# Patient Record
Sex: FEMALE | Race: WHITE | URBAN - METROPOLITAN AREA
[De-identification: names, ages, dates, MRNs, and addresses within clinical notes are randomized per-mention and may not be internally consistent; named-entity substitution may affect disease eponyms.]

---

## 2020-07-22 ENCOUNTER — TRANSFERRED RECORDS (OUTPATIENT)
Dept: HEALTH INFORMATION MANAGEMENT | Facility: CLINIC | Age: 30
End: 2020-07-22

## 2020-08-13 ENCOUNTER — HOSPITAL ENCOUNTER (OUTPATIENT)
Dept: BEHAVIORAL HEALTH | Facility: CLINIC | Age: 30
End: 2020-08-13
Attending: FAMILY MEDICINE
Payer: COMMERCIAL

## 2020-08-13 ENCOUNTER — TELEPHONE (OUTPATIENT)
Dept: BEHAVIORAL HEALTH | Facility: CLINIC | Age: 30
End: 2020-08-13

## 2020-08-13 VITALS — BODY MASS INDEX: 29.82 KG/M2 | WEIGHT: 190 LBS | HEIGHT: 67 IN

## 2020-08-13 DIAGNOSIS — Z20.822 ENCOUNTER FOR LABORATORY TESTING FOR COVID-19 VIRUS: Primary | ICD-10-CM

## 2020-08-13 LAB
LABORATORY COMMENT REPORT: NORMAL
SARS-COV-2 RNA SPEC QL NAA+PROBE: NEGATIVE
SARS-COV-2 RNA SPEC QL NAA+PROBE: NORMAL
SPECIMEN SOURCE: NORMAL
SPECIMEN SOURCE: NORMAL

## 2020-08-13 PROCEDURE — U0003 INFECTIOUS AGENT DETECTION BY NUCLEIC ACID (DNA OR RNA); SEVERE ACUTE RESPIRATORY SYNDROME CORONAVIRUS 2 (SARS-COV-2) (CORONAVIRUS DISEASE [COVID-19]), AMPLIFIED PROBE TECHNIQUE, MAKING USE OF HIGH THROUGHPUT TECHNOLOGIES AS DESCRIBED BY CMS-2020-01-R: HCPCS | Performed by: FAMILY MEDICINE

## 2020-08-13 PROCEDURE — H2035 A/D TX PROGRAM, PER HOUR: HCPCS

## 2020-08-13 PROCEDURE — 10020000 ZZH LODGING PLUS FACILITY CHARGE ADULT

## 2020-08-13 RX ORDER — ACETAMINOPHEN 325 MG/1
325-650 TABLET ORAL EVERY 4 HOURS PRN
COMMUNITY

## 2020-08-13 RX ORDER — LORATADINE 10 MG/1
10 TABLET ORAL DAILY PRN
COMMUNITY

## 2020-08-13 RX ORDER — IBUPROFEN 200 MG
200-400 TABLET ORAL EVERY 6 HOURS PRN
COMMUNITY

## 2020-08-13 RX ORDER — AMOXICILLIN 250 MG
2 CAPSULE ORAL DAILY PRN
COMMUNITY

## 2020-08-13 RX ORDER — MAGNESIUM HYDROXIDE/ALUMINUM HYDROXICE/SIMETHICONE 120; 1200; 1200 MG/30ML; MG/30ML; MG/30ML
30 SUSPENSION ORAL EVERY 6 HOURS PRN
COMMUNITY

## 2020-08-13 RX ORDER — LANOLIN ALCOHOL/MO/W.PET/CERES
3 CREAM (GRAM) TOPICAL
COMMUNITY

## 2020-08-13 ASSESSMENT — ANXIETY QUESTIONNAIRES
5. BEING SO RESTLESS THAT IT IS HARD TO SIT STILL: NEARLY EVERY DAY
1. FEELING NERVOUS, ANXIOUS, OR ON EDGE: NEARLY EVERY DAY
IF YOU CHECKED OFF ANY PROBLEMS ON THIS QUESTIONNAIRE, HOW DIFFICULT HAVE THESE PROBLEMS MADE IT FOR YOU TO DO YOUR WORK, TAKE CARE OF THINGS AT HOME, OR GET ALONG WITH OTHER PEOPLE: VERY DIFFICULT
3. WORRYING TOO MUCH ABOUT DIFFERENT THINGS: NEARLY EVERY DAY
2. NOT BEING ABLE TO STOP OR CONTROL WORRYING: MORE THAN HALF THE DAYS
7. FEELING AFRAID AS IF SOMETHING AWFUL MIGHT HAPPEN: MORE THAN HALF THE DAYS
6. BECOMING EASILY ANNOYED OR IRRITABLE: NEARLY EVERY DAY
GAD7 TOTAL SCORE: 19

## 2020-08-13 ASSESSMENT — PATIENT HEALTH QUESTIONNAIRE - PHQ9
5. POOR APPETITE OR OVEREATING: NEARLY EVERY DAY
SUM OF ALL RESPONSES TO PHQ QUESTIONS 1-9: 22

## 2020-08-13 ASSESSMENT — PAIN SCALES - GENERAL: PAINLEVEL: MILD PAIN (3)

## 2020-08-13 ASSESSMENT — MIFFLIN-ST. JEOR: SCORE: 1624.46

## 2020-08-13 NOTE — TELEPHONE ENCOUNTER
New admission today for substance treatment. No PCP established. Pt would like to resume her antidepressants. Stopped when she was using drugs daily heavily.    Dr Mitchell reviewed and okayed that it is appropriate for this patient to be scheduled.

## 2020-08-13 NOTE — PROGRESS NOTES
Initial Services Plan         Service Initiation Date: 8/13/20     Immediate health and/or safety concerns: No     Identify health and safety concern(s) below and include plan to address:     None Identified     Treatment suggestions for client during the time between intake (admit date) and completion of the individual treatment plan:      Look for a sober support network, i.e. 12 step, [facility], [facility], etc  Tour the treatment center or outpatient clinic  Introduce yourself to your treatment group. Spend time getting to know your peers  Review your patient or client handbook  Begin working on your treatment goal list     Completed by: CLARISSA Chapman  Date completed: 8/13/20

## 2020-08-13 NOTE — PROGRESS NOTES
[facility]  [address] [place] [address]                ADULT CD ASSESSMENT ADDENDUM      Patient Name: Mili Islas  Cell Phone:   Home: [phone] (home)    Mobile:   No relevant phone numbers on file.       Email:  The patient is not willing to share his/her e-mail address.  Emergency Contact: Severino Mistry   Tel: [phone]    The patient reported being:  Single, in a serious relationship    With which race do you identify? White    Initial Screening Questions     1. Are you currently having severe withdrawal symptoms that are putting yourself or others in danger?  No    2. Are you currently having severe medical problems that require immediate attention?  No    3. Are you currently having severe emotional or behavioral problems that are putting yourself or others at risk of harm?  No    4. Do you have sufficient reading skills that will enable you to understand written materials, including the program rules and client rights materials?  Yes     Family History and other additional information     Who raised you? (parents, grandparents, adoptive parents, step-parents, etc.)    Mother  Grandparents    Please tell me what it was like growing up in your family. (please include any history of substance abuse, mental health issues, emotional/physical/sexual abuse, forms of discipline, and support)      Pt states she had a really good childhood despite being raised by a teen mother. Pt's grandparents raised her too. Pt denies experiencing trauma while growing up. Pt reports getting along well with her family and also states that her stepfather does not understand addiction. Pt states her family is full of addicts and alcoholics, most of whom are in recovery. Pt has a sig other that uses methamphetamine.     Do you have any children or Stepchildren? No    Are you being investigated by Child Protection Services? No    Do you have a child protection worker, probation office or ?   Yes, explain: Probation officers in [facility] and [facility].    How would you describe your current finances?  Just making it    If you are having problems, (unpaid bills, bankruptcy, IRS problems) please explain:  No    If working or a student are you able to function appropriately in that setting? Yes, Pt states she would show up late for work or not show up at all.    Describe your preferred learning style:  by reading, by hands-on practice and by watching someone else demonstrate    What are your some of your personal strengths?  determined, focused, resilient, resourceful.    Do you currently participate in community cate activities, such as attending Anglican, temple, Pentecostalism or Jainism services?  No    How does your spirituality impact your recovery?  Client denies involvement with spiritual activities    Do you currently self-administer your medications?  Yes    Have you ever had to lie to people important to you about how much you anguiano?   No   Have you ever felt the need to bet more and more money?   No   Have you ever attempted treatment for a gambling problem?   No   Have you ever touched or fondled someone else inappropriately or forced them to have sex with you against their will?   No   Are you or have you ever been a registered sex offender?   No   Is there any history of sexual abuse in your family? No   Have you ever felt obsessed by your sexual behavior, such as having sex with many partners, masturbating often, using pornography often?   No     Have you ever received therapy or stayed in the hospital for mental health problems?   Yes, explain: therapy yes, hospital no     Have you ever hurt yourself, such as cutting, burning or hitting yourself?   No     Have you ever purged, binged or restricted yourself as a way to control your weight?   No     Are you on a special diet?   No     Do you have any concerns regarding your nutritional status?   No     Have you had any appetite changes in  the last 3 months?   No   Have you had weight loss or weight gain of more than 10 lbs in the last 3 months?   If patient gained or lost more than 10 lbs, then refer to program RN / attending Physician for assessment.   No   Was the patient informed of BMI?    Normal, No Intervention   Yes   Have you engaged in any risk-taking behavior that would put you at risk for exposure to blood-borne or sexually transmitted diseases?   No   Do you have any dental problems?   No   Have you ever lived through any trauma or stressful life events?   Yes, explain: death of my grandfather, dating a Latin Derick through [employer]   In the past month, have you had any of the following symptoms related to the trauma listed above? (dreams, intense memories, flashbacks, physical reactions, etc.)   Yes, explain: I have PTSD   Have you ever believed people were spying on you, or that someone was plotting against you or trying to hurt you?   No   Have you ever believed someone was reading your mind or could hear your thoughts or that you could actually read someone's mind or hear what another person was thinking?   No   Have you ever believed that someone of some force outside of yourself was putting thoughts into your mind or made you act in a way that was not your usual self?  Have you ever though you were possessed?   No   Have you ever believed you were being sent special messages through the TV, radio or newspaper?   No   Have you ever heard things other people couldn't hear, such as voices or other noises?   No   Have you ever had visions when you were awake?  Or have you ever seen things other people couldn't see?   No   Do you have a valid 's license?    Yes     PHQ-9, LOLITA-7 and Suicide Risk Assessment   PHQ-9 on 8/13/2020 LOLITA-7 on 8/13/2020   The patient's PHQ-9 score was 22 out of 27, indicating severe depression.   The patient's LOLITA-7 score was 19 out of 21, indicating severe anxiety.       [place]-Suicide Severity Rating  Scale   Suicide Ideation   1.) Have you ever wished you were dead or that you could go to sleep and not wake up?     Lifetime:  Yes   Past Month:  No     2.) Have you actually had any thoughts of killing yourself?   Lifetime:  No   Past Month:  No     3.) Have you been thinking about how you might do this?     Lifetime:  No   Past Month:  No     4.) Have you had these thoughts and had some intention of acting on them?     Lifetime:  No   Past Month:  No     5.) Have you started to work out the details of how to kill yourself?   Lifetime:  No   Past Month:  No     6.) Do you intend to carry out this plan?      Lifetime:  No   Past Month:  No     Intensity of Ideation   Intensity of ideation (1 being least severe, 5 being most severe):     Lifetime:  1   Past Month:  The patient denied having any suicidal thoughts within the past month.     How often do you have these thoughts?  The patient denied having any suicidal thoughts within the past month.     When you have the thoughts how long do they last?  The patient denied having any suicidal thoughts within the past month.     Can you stop thinking about killing yourself or wanting to die if you want to?  The patient denied having any suicidal thoughts within the past month.     Are there things - anyone or anything (i.e. family, Tenriism, pain of death) that stopped you from wanting to die or acting on thoughts of suicide?  Does not apply     What sort of reasons did you have for thinking about wanting to die or killing yourself (ie end pain, stop how you were feeling, get attention or reaction, revenge)?  Does not apply     Suicidal Behavior   (Suicide Attempt) - Have you made a suicide attempt?     Lifetime:  The patient had never made a suicide attempt.   Past Month:  The patient had never made a suicide attempt.     Have you engaged in self-harm (non-suicidal self-injury)?  The patient denied having any history of engaging in self-harm (non-suicidal  self-injury).     (Interrupted Attempt) - Has there been a time when you started to do something to end your life but someone or something stopped you before you actually did anything?  The patient denied having any history of an interrupted suicide attempt.     (Aborted or Self-Interrupted Attempt) - Has there been a time when you started to do something to try to end your life but you stopped yourself before you actually did anything?  The patient denied having any history of an aborted or self-interrupted suicide attempt.     (Preparatory Acts of Behavior) - Have you taken any steps towards making suicide attempt or preparing to kill yourself (such as collecting pills, getting a gun, giving valuables away or writing a suicide note)?  The patient denied having any history of taking any steps towards making a suicide attempt or preparing to kill self.     Actual Lethality/Medical Damage:  The patient denied ever making a suicidal attempt.       2008  [employer] [facility]  Used with permission by Laure Suárez, PhD.       Guide to C-SSRS Risk Ratings   NO IDEATION:  with no active thoughts IDEATION: with a wish to die. IDEATION: with active thoughts. Risk Ratings   If Yes No No 0 - Very Low Risk   If NA Yes No 1 - Low Risk   If NA Yes Yes 2 - Low/moderate risk   IDEATION: associated thoughts of methods without intent or plan INTENT: Intent to follow through on suicide PLAN: Plan to follow through on suicide Risk Ratings cont...   If Yes No No 3 - Moderate Risk   If Yes Yes No 4 - High Risk   If Yes Yes Yes 5 - High Risk   The patient's ADDITIONAL RISK FACTORS and lack of PROTECTIVE FACTORS may increase their overall suicide risk ratings.     Additional Risk Factors:    Tendency to be socially isolated and/or cut off from the support of others     Significant history of trauma and/or abuse issues     History of impulsive or aggressive behaviors     Significant legal problems including being  "at risk of incarceration   Protective Factors:    Having people in his/her life that would prevent the patient from considering a suicide attempt (i.e. young children, spouse, parents, etc.)     An absence of chronic health problems or stable and well treated chronic health issues     Having easy access to supportive family members     Having cultural, Mandaeism or spiritual beliefs that discourage suicide     Risk Status   Past month: 1. - Low Risk: Evaluation Counselors:  Document in Epic / SBAR to counselor \"Low Risk\".      Treatment Counselors:  Reassess upon admission as applicable, assess weekly in progress notes under Dimension 3 and summarize in Discharge / Treatment summary under Dimension 3.    Past 24 hours: 1. - Low Risk: Evaluation Counselors:  Document in Epic / SBAR to counselor \"Low Risk\".      Treatment Counselors:  Reassess upon admission as applicable, assess weekly in progress notes under Dimension 3 and summarize in Discharge / Treatment summary under Dimension 3.   Additional information to support suicide risk rating: There was no additional information to provide at this time.     Mental Health Status   Physical Appearance/Attire: Appears stated age   Hygiene: well groomed   Eye Contact: at examiner   Speech Rate:  regular   Speech Volume: regular   Speech Quality: fluid   Cognitive/Perceptual:  reality based   Cognition: memory intact    Judgment: intact   Insight: intact   Orientation:  time, place, person and situation   Thought: logical    Hallucinations:  none   General Behavioral Tone: cooperative   Psychomotor Activity: no problem noted   Gait:  no problem   Mood: normal   Affect: congruence/appropriate   Counselor Notes: NA     Criteria for Diagnosis: DSM-5 Criteria for Substance Use Disorders      Amphetamine Use Disorder Severe - 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  The patient is currently living in an unhealthy and/or using environment  The patient lacks relapse prevention skills  The patient has poor coping skills  The patient has poor refusal skills   The patient lacks a sober peer support network  The patient has a significant history of grief and loss issues  The patient has a significant history of guilt and shame issues    Patient/Client is willing to follow treatment recommendations.  Yes    Counselor: CLARISSA Chapman

## 2020-08-13 NOTE — PROGRESS NOTES
Name: Mili Islas  Date: 8/13/2020  Medical Record: [id number]    Envelope Number: [id number]    List of Contents (List each item separately in new row):   Cell phone    Admission:  I am responsible for any personal items that are not sent to the safe or pharmacy.  [facility] is not responsible for loss, theft or damage of any property in my possession.      Patient Signature:  ___________________________________________       Date/Time:__________________________    Staff Signature: __________________________________       Date/Time:__________________________    2nd Staff person, if patient is unable/unwilling to sign:      __________________________________________________________       Date/Time: __________________________      Discharge:  [facility] has returned all of my personal belongings:    Patient Signature: ________________________________________     Date/Time: ____________________________________    Staff Signature: ______________________________________     Date/Time:_____________________________________

## 2020-08-13 NOTE — PROGRESS NOTES
Progress Note    This patient had a Comprehensive Substance Abuse assessment on 7/22/20 completed by Charlene.  This patient was seen for a face to face update of the Comprehensive Substance Abuse assessment on 8/13/2020 by CLARISSA Chapman.  A scanned copy of the Comprehensive Substance Abuse assessment is in the patient's electronic medical record in Epic under the Media tab.    Alcohol/Drug use since the last CD evaluation (include date of last use):     Daily methamphetamine use. Pt states she can use up to 3.5g of meth per day.     Please note any other clinical changes since the last CD evaluation (such as medication changes, additional legal charges, detoxification admissions, overdoses, etc.)     No significant changes since the last CD evaluation       ASAM Dimensions Original scores Current Scores   I.) Intoxication and Withdrawal: 0 0   II.) Biomedical:  0 0   III.) Emotional and Behavioral:  2 2   IV.) Readiness to Change:  0 1   V.) Relapse Potential: 4 4   VI.) Recovery Environmental: 4 4     Please list clinical justifications for the above ASAM score changes since the original comprehensive assessment:     The patient's score on Dimension IV changed from a 0 to a 1.  The Pt has continued to abuse substances despite severe consequences, such as legal problems, negatively impacted mental health, and problems in employment.        Current FABIAN: Current UA:     0.000     Positive for Amphetamines, Methamphetamine, MDMA and THC and negative for all other screened drugs.        PHQ-9, LLOITA-7   PHQ-9 on 8/13/2020 LOLITA-7 on 8/13/2020   The patient's PHQ-9 score was 22 out of 27, indicating severe depression.   The patient's LOLITA-7 score was 19 out of 21, indicating severe anxiety.       [place]-Suicide Severity Rating Scale Reassessment   Have you ever wished you were dead or that you could go to sleep and not wake up?  Past Month:  No     Have you actually had any thoughts of killing yourself?  Past Month:   No     Have you been thinking about how you might do this?     Past Month:  No   Lifetime:  No   Have you had these thoughts and had some intention of acting on them?     Past Month:  No   Lifetime:  No   Have you started to work out the details of how to kill yourself?   Past Month:  No   Lifetime:  No   Do you intend to carry out this plan?   No     When you have the thoughts how long do they last?  The patient denied having any suicidal thoughts within the past month.     Are there things - anyone or anything (i.e. family, Baptist, pain of death) that stopped you from wanting to die or acting on thoughts of suicide?  Does not apply       2008  [facility]  Used with permission by Laure Suárez, PhD.       Guide to C-SSRS Risk Ratings   NO IDEATION:  with no active thoughts IDEATION: with a wish to die. IDEATION: with active thoughts. Risk Ratings   If Yes No No 0 - Very Low Risk   If NA Yes No 1 - Low Risk   If NA Yes Yes 2 - Low/moderate risk   IDEATION: associated thoughts of methods without intent or plan INTENT: Intent to follow through on suicide PLAN: Plan to follow through on suicide Risk Ratings cont...   If Yes No No 3 - Moderate Risk   If Yes Yes No 4 - High Risk   If Yes Yes Yes 5 - High Risk   The patient's ADDITIONAL RISK FACTORS and lack of PROTECTIVE FACTORS may increase their overall suicide risk ratings.     Additional Risk Factors:    Tendency to be socially isolated and/or cut off from the support of others     Significant history of trauma and/or abuse issues     Significant legal problems including being at risk of incarceration   Protective Factors:    Having people in his/her life that would prevent the patient from considering a suicide attempt (i.e. young children, spouse, parents, etc.)     An absence of chronic health problems or stable and well treated chronic health issues     Having easy access to supportive family members     Having a good  "community support network     Having cultural, Hindu or spiritual beliefs that discourage suicide     Risk Status   1. - Low Risk: Evaluation Counselors:  Document in Epic / SBAR to counselor \"Low Risk\".      Treatment Counselors:  Reassess upon admission as applicable, assess weekly in progress notes under Dimension 3 and summarize in Discharge / Treatment summary under Dimension 3.     Additional information to support suicide risk rating: There was no additional information to provide at this time.       "

## 2020-08-13 NOTE — PROGRESS NOTES
"[facility] Assessment      Vital signs:     Ht 1.702 m (5' 7\")   Wt 86.2 kg (190 lb)   BMI 29.76 kg/m      98.3-86-/73.    Direct admission    Counselor: Chelsi(MYLES)  Drug of Choice: Meth; Heroin  Last use: Today 8/13/20  Home clinic/MD: [facility] [address] [address]  Phone: [phone]   Psychiatrist/therapist: no    Medical history/current conditions:  None    H&P Screen:  H&P within the last 90 days: No.  Patient has been informed they are required to obtain an H&P within the next 14 days.        Mental Health diagnosis: Depression and Anxiety  Medication compliant?: No  Recent sucidal thoughts? no     When? n/a  Current thought of self-harm? no    Plan? n/a    Pain assessment:   Pt. Experiencing pain at this time?  No      Nursing Assessment Summary:  Pt reports using Meth this morning is sleeping through the assessment. Reports that she stopped using her anti-depressants when resumed daily meth use but would like to get back on the meds. Needs H& P set up as last saw provider in April 2020    On-going nursing intervention required?   No    Acute care visit recommended: no     In the last 2 weeks have you been in an large group gatherings (more than 10 people)? NO  Have you been covering your nose and mouth while out in the community? YES  ?  Have you come in contact with anyone in the last month who \"was suspected of\" or \"tested positive\" for COVID-19? NO  ?  \"Do you\" or \"have you\" had....any of the following symptoms in the last 2 weeks? NO  ?  Fever or chills   Cough   Shortness of breath or difficulty breathing   New muscle or body aches   New headache   New loss of taste or smell   Sore throat   Congestion or runny nose   New and unexplained Nausea or vomiting   Diarrhea   New and unexplained fatigue   Does the above COVID screen need to be reviewed by Infection Prevention? NO  ?  COVID TEST COMPLETED BY [facility] ? Jorge WALDRON RN  COVID-19 - Pt informed of the " following while at [facility]:  ?  Wear mask over nose/mouth at all times when out in pt milieu  ?  Staff will take temperature and O2Sats twice daily  ?  Practice good hand washing hygiene and avoid touching face  ?  If pt has any of the symptoms below, notify staff immediately.  Fever   Cough   Shortness of breath or difficulty breathing   Chills   Repeated shaking with chills   Muscle pain     Integrative Therapies: Essential Oils  Patient requesting essential oil inhaler to manage (Mood/Mental Health/Physical/Spiritual symptoms).   ?  Discussed appropriate use of essential oil inhalers and instructed patient not to leave labeled product out on unit.   ?  Patient was screened for kidney disease, asthma/reactive airway disease and rashes and wounds or 1st trimester of pregnancy  ?  List Essential Oils requested by pt  Lavender, de-habit, calm and sweet orange  ?  Patient verbalized and demonstrated understanding of how to use essential oil inhaler correctly and will notify [facility] RN with any concerns or side effects. Patient agrees not to share their essential oil inhaler with other clients.  Continue to support the patient in safely utilizing integrative therapies as able to manage symptoms during treatment.   ?  ?  Admission Ctd-Patient tobacco use:  Do you use tobacco? Yes   Type? Ciggarate  How often? dauily  How much? 6 ciggs a day   Are you interested in quitting? no?   NRT (Nicotine Replacement therapy) ordered? no   Pt is aware of the dangers of tobacco cessation and in contemplation.?   Pt given written education.  ?
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
## 2020-08-14 PROBLEM — F19.20 CHEMICAL DEPENDENCY (H): Status: ACTIVE | Noted: 2020-08-14

## 2020-08-14 ASSESSMENT — ANXIETY QUESTIONNAIRES: GAD7 TOTAL SCORE: 19

## 2020-08-14 NOTE — TELEPHONE ENCOUNTER
Writer spoke with LP RN Zeinab HERNANDES For clarification.  Appt is no longer needed - pt left LP    Writer is closing this encounter, no further action needed.     Noni Carlson    [facility]

## 2020-08-14 NOTE — PROGRESS NOTES
"        Patient was irritable and constantly asking 5th floor staff if we ve got back her Covid-19 test result expressing she was not happy to stay in her room. At 1655 writer went down to tell patient we ve received her result (Negative) and she can leave her room. At that point patient was already packing and irritable. Patient expressed that [place]  and [place] t like people restricting her to one place.           Writer coached patient that this is a protocol we follow for all new admit for the safety of everyone in our program. Writer offer to give patient tour to the unit and help her get settled and patient refused stating  not now, I just want to run outside for a smoke . Writer communicated to patient that we have designated hours for a smoking and that the next one will be at 8 pm.           Patient was irritable and responded  hell fucking no. I am not going to deal with this shit where someone is going to tell me when to smoke and when not to. I thought I m here voluntarily\". Writer communicate to patient that it is our policy and program expectation for people to smoke at designated hours due to Covid. Patient responded,  well this is not a program for me  and insisted for her valuable brought down. Patient reported she has safe place to go and left program @1728.  "

## 2020-08-14 NOTE — PROGRESS NOTES
"CHEMICAL DEPENDENCY DISCHARGE SUMMARY     PATIENT NAME: Mili Islas  : 1990    EVALUATION COUNSELOR: Sonia Stanley [facility]  TREATMENT COUNSELORS: Dasia Ryan [facility]  REFERRAL SOURCE: [facility]  PROGRAM: [facility], [facility].   ADMISSION DATE: 2020  DISCHARGE DATE: 2020    ADMISSION DIAGNOSES:   Stimulant Use Disorder, Amphetamine Type, Severe (304.40)(F15.20)    DISCHARGE DIAGNOSES:   Stimulant Use Disorder, Amphetamine Type, Severe (304.40)(F15.20)    DISCHARGE STATUS:  The patient left  program against medical advice (AMA) within hours of admission.    LAST USE DATE: Per patient report, 2020   DAYS OF TREATMENT COMPLETED: 0      PRESENTING INFORMATION:  Patient presented to [facility] for a substance use evaluation on 20.  Patient reported during this assessment that she was \"really burnt out\" from living her current lifestyle. At the time of admission to , patient reported daily methamphetamine use.  Patient also reported anxiety, depression, and PTSD diagnoses.    READMITTANCE INFORMATION: If additional substance use treatment is required, patient may re-admit into the [facility] program following 30 days from date of discharge.    SERVICES PROVIDED:  Patient remained in quarantine during her stay at [facility], pending results from standard COVID-19 testing.  Patient participated in the admission assessment, and left program AMA prior to participating in treatment programming.       ISSUES ADDRESSED IN TREATMENT:   DIMENSION 1:  ACUTE INTOXICATION AND WITHDRAWAL   ADMISSION RISK RATIN  DISCHARGE RISK RATIN  No concerns at time of discharge.  Patient reports last date of use as 20.     DIMENSION 2:  BIOMEDICAL    ADMISSION RISK RATIN  DISCHARGE RISK RATIN  No concerns at time of discharge.  Patient was given a COVID-19 test and results were negative as of 20.    DIMENSION 3:  EMOTIONAL/BEHAVIORAL  ADMISSION RISK RATIN  DISCHARGE RISK " "RATIN  Patient self-reports diagnoses of anxiety, depression, and PTSD.  The patient's PHQ-9 initial reading was 22/27, indicating severe depression.  The patient's LOLITA-7 was 19/21, indicating severe anxiety.  The patient participated in a suicide risk screening as part of the CD assessment.  The patient was given a rating of \"Low Risk\".  Upon discharge, patient denied any suicidal thoughts or ideations.    DIMENSION 4:  READINESS FOR CHANGE  ADMISSION RISK RATIN  DISCHARGE RISK RATIN  Patient left program AMA within hours of admission to the [facility].  Per staff note:        Patient was irritable and constantly asking 5th floor staff if we ve got back her Covid-19 test result expressing she was not happy to stay in her room. At 1655 writer went down to tell patient we ve received her result (Negative) and she can leave her room. At that point patient was already packing and irritable. Patient expressed that she feels like [place]  and doesn t like people restricting her to one place.       Writer coached patient that this is a protocol we follow for all new admit for the safety of everyone in our program. Writer offer to give patient tour to the unit and help her get settled and patient refused stating  not now, I just want to run outside for a smoke . Writer communicated to patient that we have designated hours for a smoking and that the next one will be at 8 pm.         Patient was irritable and responded  hell fucking no. I am not going to deal with this shit where someone is going to tell me when to smoke and when not to. I thought I m here voluntarily\". Writer communicate to patient that it is our policy and program expectation for people to smoke at designated hours due to Covid. Patient responded,  well this is not a program for me  and insisted for her valuable brought down. Patient reported she has safe place to go and left program @1728.    DIMENSION 5:  RELAPSE AND " CONTINUED USE POTENTIAL   ADMISSION RISK RATIN  DISCHARGE RISK RATIN  Patient left program AMA within hours of admission.  Patient remains a high risk for relapse at this time.    DIMENSION 6:  RECOVERY ENVIRONMENT  ADMISSION RISK RATIN  DISCHARGE RISK RATIN  Patient left program AMA within hours of admission.  Patient reported she had a safe place to go upon discharge.     PROGNOSIS:  Prognosis is unfavorable, as patient left treatment program within hours of admission.  Patient's prognosis would improve if she were to enter an alternative intensive CD treatment program and follow all recommendations.     CONTINUING CARE RECOMMENDATIONS AND REFERRALS:   1.  Abstain from all mood-altering chemicals.   2.  Attend a minimum of three 12-step meetings per week.   3.  Obtain a sponsor and maintain regular contact with her.   4.  Enter an intensive CD treatment program and follow all recommendations.  5.  Monitor and comply with advice of doctors regarding mental and physical health issues.    6.  Take all medications as prescribed.   7.  Invest in building a sober support network.     This information has been disclosed to you from records protected by Federal confidentiality rules (42 CFR part 2). The Federal rules prohibit you from making any further disclosure of this information unless further disclosure is expressly permitted by the written consent of the person to whom it pertains or as otherwise permitted by 42 CFR part 2. A general authorization for the release of medical or other information is NOT sufficient for this purpose. The Federal rules restrict any use of the information to criminally investigate or prosecute any alcohol or drug abuse patient.     CLARISSA Ordaz

## 2021-06-02 ENCOUNTER — RECORDS - HEALTHEAST (OUTPATIENT)
Dept: ADMINISTRATIVE | Facility: CLINIC | Age: 31
End: 2021-06-02